# Patient Record
(demographics unavailable — no encounter records)

---

## 2024-12-18 NOTE — HISTORY OF PRESENT ILLNESS
[de-identified] : This is a 3 year old female presenting with mother for an initial consultation.  Patient is avoiding shellfish. Patient had a reaction to shrimp 2 years ago. Patient got hives all over, not sure of timeline. Was given benadryl with good relief. Good with fish - white and red fish. Ok with milk, eggs, wheat, soy, sesame, peanut, tree nuts.  Has had a cough and fever for 2 days. Currently on around the clock tylenol.  No nasal or ocular symptoms related to seasonal change. No asthma or hx of wheezing. No eczema. No medication allergies. 1 dog at home.

## 2024-12-18 NOTE — END OF VISIT
[FreeTextEntry3] : TATIANA Montano has acted like a scribe on my behalf. I have reviewed the note and edited where appropriate. History, PE, assessment, and plan were personally performed by me.

## 2024-12-18 NOTE — REVIEW OF SYSTEMS
[Nl] : Genitourinary [Immunizations are up to date] : Immunizations are up to date [Fatigue] : no fatigue [Fever] : no fever [Decreased Appetite] : no decrease in appetite [Nausea] : no nausea [Vomiting] : no vomiting [Diarrhea] : no diarrhea [Abdominal Pain] : no abdominal pain [Decrease In Appetite] : appetite not decreased [Received Influenza Vaccine this Past Year] : Patient has not received the Influenza vaccine this past year

## 2024-12-18 NOTE — CONSULT LETTER
[Dear  ___] : Dear  [unfilled], [Consult Letter:] : I had the pleasure of evaluating your patient, [unfilled]. [Please see my note below.] : Please see my note below. [Consult Closing:] : Thank you very much for allowing me to participate in the care of this patient.  If you have any questions, please do not hesitate to contact me. [Sincerely,] : Sincerely, [FreeTextEntry2] : Dr. Enoch Reynoso